# Patient Record
Sex: FEMALE | Race: WHITE | NOT HISPANIC OR LATINO
[De-identification: names, ages, dates, MRNs, and addresses within clinical notes are randomized per-mention and may not be internally consistent; named-entity substitution may affect disease eponyms.]

---

## 2017-01-30 ENCOUNTER — HOSPITAL (OUTPATIENT)
Dept: OTHER | Age: 32
End: 2017-01-30
Attending: INTERNAL MEDICINE

## 2017-01-30 LAB
ALBUMIN SERPL-MCNC: 4.3 GM/DL (ref 3.6–5.1)
ALP SERPL-CCNC: 53 UNIT/L (ref 45–117)
ALT SERPL-CCNC: 16 UNIT/L
ANALYZER ANC (IANC): ABNORMAL
AST SERPL-CCNC: 14 UNIT/L
BASOPHILS # BLD: 0 THOUSAND/MCL (ref 0–0.3)
BASOPHILS NFR BLD: 0 %
BILIRUB CONJ SERPL-MCNC: 0.1 MG/DL (ref 0–0.2)
BILIRUB SERPL-MCNC: 0.4 MG/DL (ref 0.2–1)
CRP SERPL HS-MCNC: 0.31 MG/L
DIFFERENTIAL METHOD BLD: ABNORMAL
ENDOMYSIAL ANTIBODY IGG: NORMAL
EOSINOPHIL # BLD: 0.1 THOUSAND/MCL (ref 0.1–0.5)
EOSINOPHIL NFR BLD: 1 %
ERYTHROCYTE [DISTWIDTH] IN BLOOD: 12.6 % (ref 11–15)
ERYTHROCYTE [SEDIMENTATION RATE] IN BLOOD BY WESTERGREN METHOD: 10 MM/HR (ref 0–20)
HEMATOCRIT: 33.9 % (ref 36–46.5)
HGB BLD-MCNC: 11.6 GM/DL (ref 12–15.5)
LYMPHOCYTES # BLD: 2.1 THOUSAND/MCL (ref 1–4.8)
LYMPHOCYTES NFR BLD: 29 %
MCH RBC QN AUTO: 30.9 PG (ref 26–34)
MCHC RBC AUTO-ENTMCNC: 34.2 GM/DL (ref 32–36.5)
MCV RBC AUTO: 90.2 FL (ref 78–100)
MONOCYTES # BLD: 0.4 THOUSAND/MCL (ref 0.3–0.9)
MONOCYTES NFR BLD: 6 %
NEUTROPHILS # BLD: 4.6 THOUSAND/MCL (ref 1.8–7.7)
NEUTROPHILS NFR BLD: 64 %
NEUTS SEG NFR BLD: ABNORMAL %
PERCENT NRBC: ABNORMAL
PLATELET # BLD: 203 THOUSAND/MCL (ref 140–450)
PROT SERPL-MCNC: 7.2 GM/DL (ref 6.4–8.2)
RBC # BLD: 3.76 MILLION/MCL (ref 4–5.2)
TSH SERPL-ACNC: 0.73 MCUNIT/ML (ref 0.35–5)
TTG IGA SER IA-ACNC: 5 UNIT
TTG IGG SER IA-ACNC: 2 UNIT
WBC # BLD: 7.1 THOUSAND/MCL (ref 4.2–11)

## 2017-02-01 ENCOUNTER — HOSPITAL (OUTPATIENT)
Dept: OTHER | Age: 32
End: 2017-02-01
Attending: INTERNAL MEDICINE

## 2017-02-01 LAB — CALPROTECTIN STL-MCNT: <16 UG/G

## 2018-08-22 ENCOUNTER — LAB ENCOUNTER (OUTPATIENT)
Dept: LAB | Facility: REFERENCE LAB | Age: 33
End: 2018-08-22
Attending: OBSTETRICS & GYNECOLOGY
Payer: COMMERCIAL

## 2018-08-22 ENCOUNTER — OFFICE VISIT (OUTPATIENT)
Dept: OBGYN CLINIC | Facility: CLINIC | Age: 33
End: 2018-08-22
Payer: COMMERCIAL

## 2018-08-22 VITALS
SYSTOLIC BLOOD PRESSURE: 102 MMHG | DIASTOLIC BLOOD PRESSURE: 58 MMHG | BODY MASS INDEX: 21.74 KG/M2 | WEIGHT: 127.31 LBS | HEIGHT: 64 IN

## 2018-08-22 DIAGNOSIS — N94.3 PMS (PREMENSTRUAL SYNDROME): ICD-10-CM

## 2018-08-22 DIAGNOSIS — Z11.3 SCREENING EXAMINATION FOR VENEREAL DISEASE: ICD-10-CM

## 2018-08-22 DIAGNOSIS — Z01.419 WOMEN'S ANNUAL ROUTINE GYNECOLOGICAL EXAMINATION: Primary | ICD-10-CM

## 2018-08-22 DIAGNOSIS — N94.6 DYSMENORRHEA: ICD-10-CM

## 2018-08-22 DIAGNOSIS — Z12.4 ROUTINE CERVICAL SMEAR: ICD-10-CM

## 2018-08-22 LAB
ESTRADIOL SERPL-MCNC: 79 PG/ML
FSH SERPL-ACNC: 5.1 MIU/ML
LH SERPL-ACNC: 14.1 MIU/ML
PROGEST SERPL-MCNC: 0.6 NG/ML
PROLACTIN SERPL-MCNC: 13.3 NG/ML (ref 1.4–24.2)
TSH SERPL-ACNC: 1 UIU/ML (ref 0.45–5.33)

## 2018-08-22 PROCEDURE — 87591 N.GONORRHOEAE DNA AMP PROB: CPT | Performed by: OBSTETRICS & GYNECOLOGY

## 2018-08-22 PROCEDURE — 84146 ASSAY OF PROLACTIN: CPT | Performed by: OBSTETRICS & GYNECOLOGY

## 2018-08-22 PROCEDURE — 99395 PREV VISIT EST AGE 18-39: CPT | Performed by: OBSTETRICS & GYNECOLOGY

## 2018-08-22 PROCEDURE — 84402 ASSAY OF FREE TESTOSTERONE: CPT | Performed by: OBSTETRICS & GYNECOLOGY

## 2018-08-22 PROCEDURE — 84443 ASSAY THYROID STIM HORMONE: CPT | Performed by: OBSTETRICS & GYNECOLOGY

## 2018-08-22 PROCEDURE — 87624 HPV HI-RISK TYP POOLED RSLT: CPT | Performed by: OBSTETRICS & GYNECOLOGY

## 2018-08-22 PROCEDURE — 36415 COLL VENOUS BLD VENIPUNCTURE: CPT | Performed by: OBSTETRICS & GYNECOLOGY

## 2018-08-22 PROCEDURE — 83001 ASSAY OF GONADOTROPIN (FSH): CPT | Performed by: OBSTETRICS & GYNECOLOGY

## 2018-08-22 PROCEDURE — 84403 ASSAY OF TOTAL TESTOSTERONE: CPT | Performed by: OBSTETRICS & GYNECOLOGY

## 2018-08-22 PROCEDURE — 87491 CHLMYD TRACH DNA AMP PROBE: CPT | Performed by: OBSTETRICS & GYNECOLOGY

## 2018-08-22 PROCEDURE — 83002 ASSAY OF GONADOTROPIN (LH): CPT | Performed by: OBSTETRICS & GYNECOLOGY

## 2018-08-22 PROCEDURE — 84144 ASSAY OF PROGESTERONE: CPT | Performed by: OBSTETRICS & GYNECOLOGY

## 2018-08-22 PROCEDURE — 82670 ASSAY OF TOTAL ESTRADIOL: CPT | Performed by: OBSTETRICS & GYNECOLOGY

## 2018-08-22 RX ORDER — SERTRALINE HYDROCHLORIDE 25 MG/1
25 TABLET, FILM COATED ORAL DAILY
Qty: 56 TABLET | Refills: 3 | Status: SHIPPED | OUTPATIENT
Start: 2018-08-22 | End: 2019-08-22

## 2018-08-22 NOTE — PROGRESS NOTES
NEW GYN H&P     2018  12:23 pm  CC: Patient is here to establish care    HPI: Patient is a 29year old  LMP 8/15/2018 here for gyne exam and to discuss painful cycles and worsening PMS.  Had been on OCPs in the past but planning to to conceive s Social History Main Topics   Smoking status: Never Smoker    Smokeless tobacco: Never Used    Alcohol use Yes    Comment: occ     Drug use: No    Sexual activity: Yes    Partners: Male     Other Topics Concern    Blood Transfusions No     Social Histor cervical smear           1. Women's annual routine gynecological examination  - THINPREP PAP SMEAR B  - HPV HIGH RISK , THIN PREP COLLECTION    2. Dysmenorrhea  - VENIPUNCTURE; Future  - FSH; Future  - LH (LUTEINIZING HORMONE);  Future  - ASSAY, THYROID STI

## 2018-08-23 ENCOUNTER — ULTRASOUND ENCOUNTER (OUTPATIENT)
Dept: OBGYN CLINIC | Facility: CLINIC | Age: 33
End: 2018-08-23
Payer: COMMERCIAL

## 2018-08-23 DIAGNOSIS — N94.3 PMS (PREMENSTRUAL SYNDROME): ICD-10-CM

## 2018-08-23 DIAGNOSIS — N94.6 DYSMENORRHEA: ICD-10-CM

## 2018-08-23 LAB
C TRACH DNA SPEC QL NAA+PROBE: NEGATIVE
HPV I/H RISK 1 DNA SPEC QL NAA+PROBE: NEGATIVE
N GONORRHOEA DNA SPEC QL NAA+PROBE: NEGATIVE

## 2018-08-23 PROCEDURE — 76830 TRANSVAGINAL US NON-OB: CPT | Performed by: OBSTETRICS & GYNECOLOGY

## 2018-08-23 PROCEDURE — 76856 US EXAM PELVIC COMPLETE: CPT | Performed by: OBSTETRICS & GYNECOLOGY

## 2018-08-26 LAB
TESTOSTERONE, FREE, S: 0.34 NG/DL
TESTOSTERONE, TOTAL, S: 31 NG/DL

## 2018-08-29 ENCOUNTER — TELEPHONE (OUTPATIENT)
Dept: OBGYN CLINIC | Facility: CLINIC | Age: 33
End: 2018-08-29

## 2019-02-28 ENCOUNTER — TELEPHONE (OUTPATIENT)
Dept: OBGYN CLINIC | Facility: CLINIC | Age: 34
End: 2019-02-28

## 2019-02-28 NOTE — TELEPHONE ENCOUNTER
Most recent labs were ordered by Jane Farley DC on 2/1/19, Basil Rodolfo needs to contact her to obtain labs    Attempted to call [facility][note 3] x 3 but nobody answered the phone.  Attempted 3 different extension (#1, 3,4) and nobody answer

## 2019-02-28 NOTE — TELEPHONE ENCOUNTER
Alfredo Barrera calling calling from [facility] requesting recent labs to be faxed over to [phone] states patient is in office now.

## 2021-02-03 ENCOUNTER — PATIENT MESSAGE (OUTPATIENT)
Dept: OBGYN CLINIC | Facility: CLINIC | Age: 36
End: 2021-02-03

## 2021-02-03 NOTE — TELEPHONE ENCOUNTER
Kiar Chapman,      You can call Central Schedule at .   Any concerns or additional questions, please call us at .             From: Deena Herndon  To: Mariano Perea MD  Sent: 2/3/2021 12:33 PM CST  Subject: Non-Urgent Medic

## 2021-02-24 ENCOUNTER — OFFICE VISIT (OUTPATIENT)
Dept: OBGYN CLINIC | Facility: CLINIC | Age: 36
End: 2021-02-24
Payer: COMMERCIAL

## 2021-02-24 ENCOUNTER — LAB ENCOUNTER (OUTPATIENT)
Dept: LAB | Facility: HOSPITAL | Age: 36
End: 2021-02-24
Attending: NURSE PRACTITIONER
Payer: COMMERCIAL

## 2021-02-24 VITALS
SYSTOLIC BLOOD PRESSURE: 104 MMHG | WEIGHT: 130 LBS | HEIGHT: 64 IN | BODY MASS INDEX: 22.2 KG/M2 | DIASTOLIC BLOOD PRESSURE: 62 MMHG

## 2021-02-24 DIAGNOSIS — Z01.419 ENCOUNTER FOR WELL WOMAN EXAM: Primary | ICD-10-CM

## 2021-02-24 DIAGNOSIS — Z11.3 ROUTINE SCREENING FOR STI (SEXUALLY TRANSMITTED INFECTION): ICD-10-CM

## 2021-02-24 DIAGNOSIS — Z01.419 ENCOUNTER FOR WELL WOMAN EXAM: ICD-10-CM

## 2021-02-24 DIAGNOSIS — N94.6 DYSMENORRHEA: ICD-10-CM

## 2021-02-24 DIAGNOSIS — N92.6 IRREGULAR MENSES: ICD-10-CM

## 2021-02-24 LAB
HBV SURFACE AG SER-ACNC: <0.1 [IU]/L
HBV SURFACE AG SERPL QL IA: NONREACTIVE
HCV AB SERPL QL IA: NONREACTIVE
T PALLIDUM AB SER QL: NEGATIVE

## 2021-02-24 PROCEDURE — 99385 PREV VISIT NEW AGE 18-39: CPT | Performed by: NURSE PRACTITIONER

## 2021-02-24 PROCEDURE — 36415 COLL VENOUS BLD VENIPUNCTURE: CPT

## 2021-02-24 PROCEDURE — 87340 HEPATITIS B SURFACE AG IA: CPT

## 2021-02-24 PROCEDURE — 87389 HIV-1 AG W/HIV-1&-2 AB AG IA: CPT

## 2021-02-24 PROCEDURE — 3008F BODY MASS INDEX DOCD: CPT | Performed by: NURSE PRACTITIONER

## 2021-02-24 PROCEDURE — 86780 TREPONEMA PALLIDUM: CPT

## 2021-02-24 PROCEDURE — 3078F DIAST BP <80 MM HG: CPT | Performed by: NURSE PRACTITIONER

## 2021-02-24 PROCEDURE — 3074F SYST BP LT 130 MM HG: CPT | Performed by: NURSE PRACTITIONER

## 2021-02-24 PROCEDURE — 86803 HEPATITIS C AB TEST: CPT

## 2021-02-24 NOTE — PROGRESS NOTES
[facility]  Obstetrics and Gynecology  Annual Gynecology Visit  Dwight Laws, MSN, APRN, FNP-BC    HPI   Keesha Schulte is a 37year old female who presents for her annual gynecology exam.  Menses are irregular. At best cycles are 32-34 days apart. History    Tobacco Use      Smoking status: Never Smoker      Smokeless tobacco: Never Used    Alcohol use: Yes      Comment: occ     Drug use: No      Exercise:  twice per week.   Diet: watches minimally     REVIEW OF SYSTEMS   Review of Systems   Constitu discharge, friability or lesion. Uterus: Normal. Not enlarged and not tender. Adnexa:         Right: No mass, tenderness or fullness. Left: No mass, tenderness or fullness. Musculoskeletal: Normal range of motion.    Lymphadenopathy: None        Luis Enrique Montes, APRN  2/24/2021  10:10 AM

## 2021-02-26 LAB
GENITAL VAGINOSIS SCREEN: NEGATIVE
LAST PAP RESULT: NORMAL
PAP HISTORY (OTHER THAN LAST PAP): NORMAL
TRICHOMONAS SCREEN: NEGATIVE

## 2021-02-26 RX ORDER — FLUCONAZOLE 150 MG/1
150 TABLET ORAL ONCE
Qty: 1 TABLET | Refills: 0 | Status: SHIPPED | OUTPATIENT
Start: 2021-02-26 | End: 2021-02-26

## 2021-03-01 RX ORDER — METRONIDAZOLE 7.5 MG/G
1 GEL VAGINAL NIGHTLY
Qty: 1 TUBE | Refills: 0 | Status: SHIPPED | OUTPATIENT
Start: 2021-03-01 | End: 2021-03-06

## 2024-08-15 ENCOUNTER — OFFICE VISIT (OUTPATIENT)
Dept: OBGYN CLINIC | Facility: CLINIC | Age: 39
End: 2024-08-15
Payer: COMMERCIAL

## 2024-08-15 VITALS
SYSTOLIC BLOOD PRESSURE: 100 MMHG | BODY MASS INDEX: 27.14 KG/M2 | WEIGHT: 159 LBS | HEIGHT: 64 IN | DIASTOLIC BLOOD PRESSURE: 78 MMHG

## 2024-08-15 DIAGNOSIS — Z12.4 PAP SMEAR FOR CERVICAL CANCER SCREENING: ICD-10-CM

## 2024-08-15 DIAGNOSIS — Z01.419 VISIT FOR GYNECOLOGIC EXAMINATION: Primary | ICD-10-CM

## 2024-08-15 DIAGNOSIS — E28.2 PCOS (POLYCYSTIC OVARIAN SYNDROME): ICD-10-CM

## 2024-08-15 PROCEDURE — 87624 HPV HI-RISK TYP POOLED RSLT: CPT | Performed by: OBSTETRICS & GYNECOLOGY

## 2024-08-15 PROCEDURE — 3008F BODY MASS INDEX DOCD: CPT | Performed by: OBSTETRICS & GYNECOLOGY

## 2024-08-15 PROCEDURE — 99385 PREV VISIT NEW AGE 18-39: CPT | Performed by: OBSTETRICS & GYNECOLOGY

## 2024-08-15 PROCEDURE — 3074F SYST BP LT 130 MM HG: CPT | Performed by: OBSTETRICS & GYNECOLOGY

## 2024-08-15 PROCEDURE — 3078F DIAST BP <80 MM HG: CPT | Performed by: OBSTETRICS & GYNECOLOGY

## 2024-08-15 RX ORDER — HYDROXYZINE PAMOATE 25 MG/1
CAPSULE ORAL
COMMUNITY
Start: 2024-04-22

## 2024-08-15 RX ORDER — LORAZEPAM 0.5 MG/1
TABLET ORAL
COMMUNITY
Start: 2024-04-22

## 2024-08-15 NOTE — PROGRESS NOTES
ANNUAL GYN EXAM  [facility]    CHIEF COMPLAINT:    Chief Complaint   Patient presents with    Annual     Pap smear     Polycystic Ovary Syndrome (PCOS)     Pt states well managed just wants to f/u      HISTORY OF PRESENT ILLNESS:   Lucie Vaughan is a 38 year old female   who presents for annual well woman visit.  She is feeling well.    Annual exam    PCOS - symptoms were worse after her third bout with covid.    Periods have gotten back to q 28-35 days now.  Has changed diet - less sugar, avoids dairy  Jaison-inositol has helped.    Patient's last menstrual period was 2024 (exact date).  Regular now, 4-5 days, flow varies (was very heavy after covid), cramps pretty intense    + sex with 1 female partner x 3 years    No h/o STI    Last pap smear , normal, hpv neg  (Partners does HR hpv - had LGSIL in the past had a LEEP, but it resolved)    Exercise: 3-4 times per week  Diet; pretty good  Mood: very stressed but fine    No desire for pregnancy in the future.      PAST MEDICAL HISTORY:   Past Medical History:    Anemia    in the past     Patient denies medical problems    as of 2018        PAST SURGICAL HISTORY:   Past Surgical History:   Procedure Laterality Date    Tonsillectomy          PAST OB HISTORY:  OB History    Para Term  AB Living   0 0 0 0 0 0   SAB IAB Ectopic Multiple Live Births   0 0 0 0 0       CURRENT MEDICATIONS:      Current Outpatient Medications:     hydrOXYzine Pamoate 25 MG Oral Cap, TAKE 1 CAPSULE BY MOUTH 1 TO 2 TIMES A DAY AS NEEDED (Patient not taking: Reported on 8/15/2024), Disp: , Rfl:     LORazepam 0.5 MG Oral Tab, TAKE 1 TABLET BY MOUTH DAILY FOR 14 DAYS AS NEEDED (Patient not taking: Reported on 8/15/2024), Disp: , Rfl:     ALLERGIES:  Allergies   Allergen Reactions    Sulfa Antibiotics NAUSEA AND VOMITING       SOCIAL HISTORY:  Social History     Socioeconomic History    Marital status:    Occupational History    Occupation:  natural homeopathic doctor   Tobacco Use    Smoking status: Never    Smokeless tobacco: Never   Vaping Use    Vaping status: Never Used   Substance and Sexual Activity    Alcohol use: Not Currently     Comment: occ     Drug use: No    Sexual activity: Yes     Partners: Female   Other Topics Concern    Blood Transfusions No       FAMILY HISTORY:  Family History   Problem Relation Age of Onset    No Known Problems Father     No Known Problems Mother     Heart Disorder Maternal Grandmother     Diabetes Maternal Grandmother     Other (Other) Maternal Grandfather      ASSESSMENTS:  REVIEW OF SYSTEMS:  CONSTITUTIONAL:  negative for fevers, chills and sweats    EYES:  negative for  blurred vision and visual disturbance  RESPIRATORY:  negative for  cough and shortness of breath  CARDIOVASCULAR:  negative for  chest pain, palpitations  GASTROINTESTINAL:  No constipation/diarrhea, no pain  GENITOURINARY:  See History of Present Illness  INTEGUMENT/BREAST: Breast: no masses, no nipple discharge  ENDOCRINE:  negative for acne, constipation, diarrhea, cold intolerance, heat intolerance, fatigue, hair loss, weight gain and weight loss  MUSCULOSKELETAL:  negative for joint pain  NEUROLOGICAL:  negative for dizziness/lightheadedness and headaches  BEHAVIOR/PSYCH:  Negative for depressed mood, anhedonia and anxiety    PHYSICAL EXAM  Patient's last menstrual period was 08/01/2024 (exact date).   Vitals:    08/15/24 1456   BP: 100/78   Weight: 159 lb (72.1 kg)   Height: 64\"       CONSTITUTIONAL: Awake, alert, cooperative, no apparent distress, and appears stated age   NECK: Supple, symmetrical, trachea midline, no adenopathy, thyroid symmetric, not enlarged and no tenderness  LUNGS: no excess work of breathing  ABDOMEN: Soft, non-distended, non-tender, no masses palpated    CHEST/BREASTS: Breasts symmetrical, skin without lesion(s), no nipple retraction or dimpling, no nipple discharge, no masses palpated, no axillary or  supraclavicular adenopathy  GENITAL/URINARY:    External Genitalia:  General appearance; normal, Hair distribution; normal, Lesions absent   Urethral Meatus:  Lesions absent, Prolapse absent  Bladder:  Tenderness absent, Cystocele absent  Vagina:  Discharge absent, Lesions absent, Pelvic support normal  Cervix:  Lesions absent, Discharge absent, Tenderness absent  Uterus:  Size normal, Masses absent, Tenderness absent  Adnexa:  Masses absent, Tenderness absent  Anus/Perineum:  Lesions absent    MUSCULOSKELETAL: There is no redness, warmth, or swelling of the joints.  Tone is normal.  NEUROLOGIC: Patient is awake, alert and oriented to name, place and time. Casual gait is normal.  SKIN: no bruising or bleeding and no rashes  PSYCHIATRIC: Behavior:  Appropriate  Mood:  appropriate  ASSESSMENT AND PLAN:  1. Visit for gynecologic examination  - CBE and pelvic exam with pap/hpv today. Self breast awareness discussed.  - recommended HPV vaccine.  - ThinPrep PAP Smear; Future  - Hpv Dna  High Risk , Thin Prep Collect; Future    2. Pap smear for cervical cancer screening  - ThinPrep PAP Smear; Future  - Hpv Dna  High Risk , Thin Prep Collect; Future    3. PCOS (polycystic ovarian syndrome)  - cont nimisha-inositol. Pcosaa info given.       follow up 1 yr or as needed  Ana M Villalta DO

## 2024-08-15 NOTE — PATIENT INSTRUCTIONS
PCOS awareness association - www.pcosaa.com    The below list is from an article on [employer] https://www.nanoRETE.My Dog Bowl/what-is-the-best-diet-for-pcos-1951551 (the website has a chart that might be more visually pleasing than a simple list.)    Compliant  High-fiber fruits and vegetables (apples, plums, broccoli, cauliflower)   Leafy greens  Root veggies   Red berries and grapes   Beans, legumes, lentils  Whole-grain or multigrain bread, crackers, pasta, tortillas  Brown rice, quinoa  Oats, rye, barley   Flax, nicky, and sunflower seeds  Cottage cheese  Lean chicken or turkey (without the skin)  Fatty fish (salmon, tuna)  Veggie burgers  Eggs, egg whites, egg substitutes  Low-fat and Greek yogurt  Non-dairy milk alternatives (almond, rice)  Avocado   Hummus   Coconut and coconut oil   Nuts and nut butters  Olive oil, flax seed oil   Fresh herbs and spices (turmeric, cinnamon, melissa)  Dark chocolate (in moderation)   Green tea      Non-Compliant  Bread, baked goods, crackers, pasta, and cereals made from refined white flour  Starchy vegetables (white potatoes, corn, peas)  White rice  Red meat  Full-fat dairy   Processed meat (lunch meat, hot dogs, sausage, castro)  Fried food, fast food  Potato chips, microwave popcorn, salted pretzels   Dried fruit   Packaged snack foods  Frozen meals and snacks  Artificial sweeteners   Granola, cereal bars  Margarine, shortening, lard   Instant noodles, packaged pasta/soup mix  Bouillon cubes, broth, stock   Commercial salad dressing, marinades, seasonings   Milk/chocolate, candy   Ice cream, pudding, custard  Pastries, cake, cookies, pies  Soda  Sugary fruit juice   Energy drinks   * Gluten and wheat  ** Soy products (tofu, soy milk)

## 2024-08-16 LAB — HPV E6+E7 MRNA CVX QL NAA+PROBE: NEGATIVE

## (undated) NOTE — MR AVS SNAPSHOT
After Visit Summary   2/24/2021    Bart Borges    MRN: [id number]           Visit Information     Date & Time  2/24/2021 10:00 AM Provider  [address] [facility]  Phone  [phone] T PALLIDUM SCREENING CASCADE [3356487 CPT(R)]  2/24/2021 (Approximate) 2/24/2022    THINPREP PAP SMEAR B [[id number] CUSTOM]  2/24/2021 2/24/2022    THINPREP PAP SMEAR B [[id number] CUSTOM]  2/24/2021 2/24/2022      Follow-up Instructions    Return in about 1 y Treatment for acute illness   or injury that are   non-life-threatening.   Also available by appointment     Average cost  $70*       [address]   Monday – Friday  10:00 am – 1